# Patient Record
Sex: MALE | Race: OTHER | HISPANIC OR LATINO | URBAN - METROPOLITAN AREA
[De-identification: names, ages, dates, MRNs, and addresses within clinical notes are randomized per-mention and may not be internally consistent; named-entity substitution may affect disease eponyms.]

---

## 2023-08-03 ENCOUNTER — EMERGENCY (EMERGENCY)
Facility: HOSPITAL | Age: 38
LOS: 1 days | Discharge: HOME | End: 2023-08-03
Attending: EMERGENCY MEDICINE
Payer: COMMERCIAL

## 2023-08-03 VITALS
OXYGEN SATURATION: 98 % | HEART RATE: 90 BPM | SYSTOLIC BLOOD PRESSURE: 117 MMHG | TEMPERATURE: 98 F | WEIGHT: 240.3 LBS | DIASTOLIC BLOOD PRESSURE: 70 MMHG | RESPIRATION RATE: 18 BRPM

## 2023-08-03 VITALS
RESPIRATION RATE: 18 BRPM | TEMPERATURE: 98 F | SYSTOLIC BLOOD PRESSURE: 112 MMHG | DIASTOLIC BLOOD PRESSURE: 74 MMHG | OXYGEN SATURATION: 98 % | HEART RATE: 78 BPM

## 2023-08-03 PROCEDURE — 99282 EMERGENCY DEPT VISIT SF MDM: CPT

## 2023-08-03 PROCEDURE — 99283 EMERGENCY DEPT VISIT LOW MDM: CPT

## 2023-08-03 RX ORDER — ACETAMINOPHEN WITH CODEINE 300MG-30MG
1 TABLET ORAL ONCE
Refills: 0 | Status: DISCONTINUED | OUTPATIENT
Start: 2023-08-03 | End: 2023-08-03

## 2023-08-03 RX ADMIN — Medication 1 TABLET(S): at 03:53

## 2023-08-03 NOTE — ED PROVIDER NOTE - CLINICAL SUMMARY MEDICAL DECISION MAKING FREE TEXT BOX
37-year-old male presenting with pain overlying teeth to left lower jaw.  No obvious abnormality besides small cavity to tooth #31.  We will provide pain medication for use today.  Patient anticipates visit to his dentist later on this morning.

## 2023-08-03 NOTE — ED PROVIDER NOTE - OBJECTIVE STATEMENT
37-year-old male denies past medical history here for pain over left lower jaw.  States that his teeth to left jaw have been more sensitive over the past day.  Painful when he bites.  States he had pain earlier to the front teeth of his lower jaw yesterday and saw his dentist 2 days ago who informed him that his x-rays were normal and did not know why he had pain.  Recommended patient to take 3 Motrin which he did 6 hours ago to little relief.  Patient denies any swelling, fever, throat pain, trauma, headache, ear pain or neck pain.

## 2023-08-03 NOTE — ED PROVIDER NOTE - PATIENT PORTAL LINK FT
You can access the FollowMyHealth Patient Portal offered by [facility] by registering at the following website: [email or web address]. By joining [employer]’s FollowMyHealth portal, you will also be able to view your health information using other applications (apps) compatible with our system.

## 2023-08-03 NOTE — ED ADULT NURSE NOTE - NSFALLUNIVINTERV_ED_ALL_ED
Bed/Stretcher in lowest position, wheels locked, appropriate side rails in place/Call bell, personal items and telephone in reach/Instruct patient to call for assistance before getting out of bed/chair/stretcher/Non-slip footwear applied when patient is off stretcher/[place] to call system/Physically safe environment - no spills, clutter or unnecessary equipment/Purposeful proactive rounding/Room/bathroom lighting operational, light cord in reach

## 2024-03-09 NOTE — ED ADULT NURSE NOTE - DISCHARGE DATE/TIME
Please take a list of all of your medications and discharge paperwork with you to all of your follow-up medical visits. Please take all of your medications as directed. Please call your family doctor or return to the ER if you have increased shortness of breath, chest pain, fevers, chills, nausea, vomiting, diarrhea, or any other worsening symptoms.    
03-Aug-2023 04:32

## 2024-10-23 NOTE — ED ADULT NURSE NOTE - BREATHING, MLM
Pt reports he wants to leave AMA. MD Ryan made aware and went to bedside to talk to pt, but pt had already left   Spontaneous, unlabored and symmetrical

## 2025-04-12 ENCOUNTER — EMERGENCY (EMERGENCY)
Facility: HOSPITAL | Age: 40
LOS: 1 days | End: 2025-04-12
Attending: STUDENT IN AN ORGANIZED HEALTH CARE EDUCATION/TRAINING PROGRAM
Payer: COMMERCIAL

## 2025-04-12 VITALS
RESPIRATION RATE: 20 BRPM | WEIGHT: 218.04 LBS | TEMPERATURE: 98 F | HEART RATE: 91 BPM | OXYGEN SATURATION: 98 % | DIASTOLIC BLOOD PRESSURE: 80 MMHG | HEIGHT: 70 IN | SYSTOLIC BLOOD PRESSURE: 118 MMHG

## 2025-04-12 VITALS
SYSTOLIC BLOOD PRESSURE: 120 MMHG | TEMPERATURE: 98 F | RESPIRATION RATE: 18 BRPM | HEART RATE: 96 BPM | DIASTOLIC BLOOD PRESSURE: 75 MMHG | OXYGEN SATURATION: 96 %

## 2025-04-12 LAB
ADD ON TEST-SPECIMEN IN LAB: SIGNIFICANT CHANGE UP
ALBUMIN SERPL ELPH-MCNC: 4.4 G/DL — SIGNIFICANT CHANGE UP (ref 3.3–5)
ALP SERPL-CCNC: 62 U/L — SIGNIFICANT CHANGE UP (ref 40–120)
ALT FLD-CCNC: 20 U/L — SIGNIFICANT CHANGE UP (ref 10–45)
ANION GAP SERPL CALC-SCNC: 12 MMOL/L — SIGNIFICANT CHANGE UP (ref 5–17)
AST SERPL-CCNC: 18 U/L — SIGNIFICANT CHANGE UP (ref 10–40)
BASOPHILS # BLD AUTO: 0.04 K/UL — SIGNIFICANT CHANGE UP (ref 0–0.2)
BASOPHILS NFR BLD AUTO: 0.4 % — SIGNIFICANT CHANGE UP (ref 0–2)
BILIRUB SERPL-MCNC: 1 MG/DL — SIGNIFICANT CHANGE UP (ref 0.2–1.2)
BUN SERPL-MCNC: 15 MG/DL — SIGNIFICANT CHANGE UP (ref 7–23)
CALCIUM SERPL-MCNC: 9.2 MG/DL — SIGNIFICANT CHANGE UP (ref 8.4–10.5)
CHLORIDE SERPL-SCNC: 103 MMOL/L — SIGNIFICANT CHANGE UP (ref 96–108)
CK SERPL-CCNC: 151 U/L — SIGNIFICANT CHANGE UP (ref 30–200)
CO2 SERPL-SCNC: 23 MMOL/L — SIGNIFICANT CHANGE UP (ref 22–31)
CREAT SERPL-MCNC: 1.24 MG/DL — SIGNIFICANT CHANGE UP (ref 0.5–1.3)
EGFR: 75 ML/MIN/1.73M2 — SIGNIFICANT CHANGE UP
EGFR: 75 ML/MIN/1.73M2 — SIGNIFICANT CHANGE UP
EOSINOPHIL # BLD AUTO: 0.06 K/UL — SIGNIFICANT CHANGE UP (ref 0–0.5)
EOSINOPHIL NFR BLD AUTO: 0.7 % — SIGNIFICANT CHANGE UP (ref 0–6)
FOLATE SERPL-MCNC: 18.9 NG/ML — SIGNIFICANT CHANGE UP
GLUCOSE SERPL-MCNC: 87 MG/DL — SIGNIFICANT CHANGE UP (ref 70–99)
HCT VFR BLD CALC: 42.5 % — SIGNIFICANT CHANGE UP (ref 39–50)
HGB BLD-MCNC: 14.1 G/DL — SIGNIFICANT CHANGE UP (ref 13–17)
IMM GRANULOCYTES NFR BLD AUTO: 0.5 % — SIGNIFICANT CHANGE UP (ref 0–0.9)
LYMPHOCYTES # BLD AUTO: 2.24 K/UL — SIGNIFICANT CHANGE UP (ref 1–3.3)
LYMPHOCYTES # BLD AUTO: 24.4 % — SIGNIFICANT CHANGE UP (ref 13–44)
MAGNESIUM SERPL-MCNC: 2.2 MG/DL — SIGNIFICANT CHANGE UP (ref 1.6–2.6)
MCHC RBC-ENTMCNC: 29.8 PG — SIGNIFICANT CHANGE UP (ref 27–34)
MCHC RBC-ENTMCNC: 33.2 G/DL — SIGNIFICANT CHANGE UP (ref 32–36)
MCV RBC AUTO: 89.9 FL — SIGNIFICANT CHANGE UP (ref 80–100)
MONOCYTES # BLD AUTO: 0.92 K/UL — HIGH (ref 0–0.9)
MONOCYTES NFR BLD AUTO: 10 % — SIGNIFICANT CHANGE UP (ref 2–14)
NEUTROPHILS # BLD AUTO: 5.87 K/UL — SIGNIFICANT CHANGE UP (ref 1.8–7.4)
NEUTROPHILS NFR BLD AUTO: 64 % — SIGNIFICANT CHANGE UP (ref 43–77)
NRBC BLD AUTO-RTO: 0 /100 WBCS — SIGNIFICANT CHANGE UP (ref 0–0)
PHOSPHATE SERPL-MCNC: 3.5 MG/DL — SIGNIFICANT CHANGE UP (ref 2.5–4.5)
PLATELET # BLD AUTO: 461 K/UL — HIGH (ref 150–400)
POTASSIUM SERPL-MCNC: 4 MMOL/L — SIGNIFICANT CHANGE UP (ref 3.5–5.3)
POTASSIUM SERPL-SCNC: 4 MMOL/L — SIGNIFICANT CHANGE UP (ref 3.5–5.3)
PROT SERPL-MCNC: 7.2 G/DL — SIGNIFICANT CHANGE UP (ref 6–8.3)
RBC # BLD: 4.73 M/UL — SIGNIFICANT CHANGE UP (ref 4.2–5.8)
RBC # FLD: 12.3 % — SIGNIFICANT CHANGE UP (ref 10.3–14.5)
SODIUM SERPL-SCNC: 138 MMOL/L — SIGNIFICANT CHANGE UP (ref 135–145)
T4 FREE SERPL-MCNC: 1.6 NG/DL — SIGNIFICANT CHANGE UP (ref 0.9–1.8)
TSH SERPL-MCNC: 1.06 UIU/ML — SIGNIFICANT CHANGE UP (ref 0.27–4.2)
VIT B12 SERPL-MCNC: 507 PG/ML — SIGNIFICANT CHANGE UP (ref 232–1245)
WBC # BLD: 9.18 K/UL — SIGNIFICANT CHANGE UP (ref 3.8–10.5)
WBC # FLD AUTO: 9.18 K/UL — SIGNIFICANT CHANGE UP (ref 3.8–10.5)

## 2025-04-12 PROCEDURE — 93005 ELECTROCARDIOGRAM TRACING: CPT

## 2025-04-12 PROCEDURE — 96374 THER/PROPH/DIAG INJ IV PUSH: CPT

## 2025-04-12 PROCEDURE — 80053 COMPREHEN METABOLIC PANEL: CPT

## 2025-04-12 PROCEDURE — 85025 COMPLETE CBC W/AUTO DIFF WBC: CPT

## 2025-04-12 PROCEDURE — 93010 ELECTROCARDIOGRAM REPORT: CPT

## 2025-04-12 PROCEDURE — 84100 ASSAY OF PHOSPHORUS: CPT

## 2025-04-12 PROCEDURE — 84443 ASSAY THYROID STIM HORMONE: CPT

## 2025-04-12 PROCEDURE — 82607 VITAMIN B-12: CPT

## 2025-04-12 PROCEDURE — 82550 ASSAY OF CK (CPK): CPT

## 2025-04-12 PROCEDURE — 70450 CT HEAD/BRAIN W/O DYE: CPT | Mod: 26

## 2025-04-12 PROCEDURE — 70450 CT HEAD/BRAIN W/O DYE: CPT | Mod: MC

## 2025-04-12 PROCEDURE — 83735 ASSAY OF MAGNESIUM: CPT

## 2025-04-12 PROCEDURE — 99284 EMERGENCY DEPT VISIT MOD MDM: CPT

## 2025-04-12 PROCEDURE — 84439 ASSAY OF FREE THYROXINE: CPT

## 2025-04-12 PROCEDURE — 82306 VITAMIN D 25 HYDROXY: CPT

## 2025-04-12 PROCEDURE — 71046 X-RAY EXAM CHEST 2 VIEWS: CPT | Mod: 26

## 2025-04-12 PROCEDURE — 71046 X-RAY EXAM CHEST 2 VIEWS: CPT

## 2025-04-12 PROCEDURE — 82746 ASSAY OF FOLIC ACID SERUM: CPT

## 2025-04-12 PROCEDURE — 99285 EMERGENCY DEPT VISIT HI MDM: CPT | Mod: 25

## 2025-04-12 RX ADMIN — Medication 105 MILLIGRAM(S): at 17:15

## 2025-04-12 NOTE — ED ADULT TRIAGE NOTE - CHIEF COMPLAINT QUOTE
fatigue, nausea and numbness of feet for and fingers for couple of weeks, on Wegovy, loss 30 lbs in 3 months

## 2025-04-12 NOTE — ED PROVIDER NOTE - CLINICAL SUMMARY MEDICAL DECISION MAKING FREE TEXT BOX
40 y/o male hx Meniere's disease presents with fatigue, weakness, cold intolerance, imbalance, weight loss, and emotional lability. He is hemodynamically stable, afebrile, on exam he is resting comfortably in bed with even, unlabored respirations. Lungs are clear bilaterally, abdomen soft non-tender, no leg swelling, no rash, no neck tenderness/swelling. Broad differential at this time includes metabolic disturbance vs autoimmune vs Meniere's vs psychiatric (though no new stress triggers or psychiatric history). Will workup with labs, CT head, ekg, reassess.

## 2025-04-12 NOTE — ED PROVIDER NOTE - PROGRESS NOTE DETAILS
ct head negative labs unremarkable pt informed of findings will have outpt follow up w PCP on monday

## 2025-04-12 NOTE — ED PROVIDER NOTE - ATTENDING CONTRIBUTION TO CARE
39M w/ hx of Meniere's disease herew/ 20 days  of fatigue and weakness and emotional lability w/ increased depressive symptoms, no si/hi/ah/vh, lives w/ wife, has been on wegovy w/ last dose on April 3, pt w/ no fevers, no chills, no nausea no vomiting, no cp, no sob. no diarrhea. on exam, pt is awake and alert oriented x3, has ctab, soft abdomen, no lower leg edema, has 5/5 upper and lower extremity strength intact sensation in b/l arms and legs, pt w/ no facial droop, plan for labs imaging c/f electrolyte imbalance vitamin def, medication side effect vs autoimune, pt w/ no needs for inpatient psychiatry at this time will give informatio [facility] crisis center, pt will have outpt pcp follow up on monday can bring labs to office, counselled to stay off wegovy pt comfortable w/ plan

## 2025-04-12 NOTE — ED PROVIDER NOTE - PATIENT PORTAL LINK FT
You can access the FollowMyHealth Patient Portal offered by [facility] by registering at the following website: [email or web address]. By joining [employer]’s FollowMyHealth portal, you will also be able to view your health information using other applications (apps) compatible with our system.

## 2025-04-12 NOTE — ED ADULT NURSE NOTE - OBJECTIVE STATEMENT
Patient c/o fatigue, lightheadedness, difficulty concentrating, occasional tingling in arms and legs, nausea, and feeling more emotional x 1.5 weeks. Patient has hx Meniere's but states dizziness does not feel similar to when he has episodes of vertigo. States he started Wegovy 3 months ago for weight loss, no other new medications. Patient denies chest pain, sob, fever, chills, v/d. Patient A&Ox4, ambulatory. No signs of acute distress at this time. Plan of care in progress.

## 2025-04-12 NOTE — ED PROVIDER NOTE - NSFOLLOWUPINSTRUCTIONS_ED_ALL_ED_FT
You were seen in the emergency department today. stay hydrated. stay off the wegovy until you talk with your doctors. Please see your primary care doctor on monday.   We recommend that you return if you develop chest pain, abdominal pain, shortness of breath, lightheadedness, vomiting, leg swelling, fever, headache, slurred speech, weakness or blurry vision.     Below are resources to the [facility] on [facility] Information:    -Walk-in hours: Monday to Friday, 9am to 3pm   -Almost all walk-in patients will be able to see a psych prescriber the same day   -Scheduled, non-urgent, evening remote/virtual appointments are available on a limited basis. Call our  to inquire about these: [phone]. A crisis center clinician screens these requests in the late afternoon and if appropriate it takes a few days to set-up.   -Visits take about 2 to 4 hours total   -Mornings are the best time for patients to arrive    For Telehealth options try:  [employer]: [email or web address] (to access psychiatrist or therapist)  [facility]: [email or web address] (to access psychiatrist or therapist)  [facility]: [email or web address] (Largest online therapy group)

## 2025-04-12 NOTE — ED PROVIDER NOTE - OBJECTIVE STATEMENT
38 y/o male hx Meniere's disease presents with fatigue, weakness. For the last 1-2 weeks he has felt persistently off balance (different than normal Meniere vertigo), tired, cold, and emotionally labile. He started taking wegovy for weight loss 3 months ago, has lost about 30 pounds since. Also endorses constipation. He has never had similar symptoms before, denies family history of thyroid/autoimmune disease, denies chest pain, denies difficulty breathing, denies n/v/d. No recent travel or sick contacts.

## 2025-04-12 NOTE — ED ADULT NURSE NOTE - GENITOURINARY ASSESSMENT
Underwent cystoscopy, left ureterscopy, Holmium laser lithotripsy, basket stone extraction, retrograde pyelogram, left stent insertion by Dr. Senior on 5/3/2024, stent no longer in place and removed on  Stone analysis demonstrated 70% calcium oxalate, 90% calcium oxalate  Kidney and bladder ultrasound on 5/10/2024 demonstrated a bilateral nonobstructing renal calculi, mild hydronephrosis of the left kidney that was markedly improved from previous CT imaging, present left ureteral jet  Kidney and bladder US in 6 weeks, will call with results    - - -